# Patient Record
Sex: MALE | Race: WHITE | NOT HISPANIC OR LATINO | URBAN - METROPOLITAN AREA
[De-identification: names, ages, dates, MRNs, and addresses within clinical notes are randomized per-mention and may not be internally consistent; named-entity substitution may affect disease eponyms.]

---

## 2024-06-17 ENCOUNTER — APPOINTMENT (RX ONLY)
Dept: URBAN - METROPOLITAN AREA CLINIC 103 | Facility: CLINIC | Age: 29
Setting detail: DERMATOLOGY
End: 2024-06-17

## 2024-06-17 DIAGNOSIS — L20.89 OTHER ATOPIC DERMATITIS: ICD-10-CM | Status: WORSENING

## 2024-06-17 PROCEDURE — 99204 OFFICE O/P NEW MOD 45 MIN: CPT

## 2024-06-17 PROCEDURE — ? PRESCRIPTION

## 2024-06-17 PROCEDURE — ? OTC TREATMENT REGIMEN

## 2024-06-17 PROCEDURE — ? PRESCRIPTION MEDICATION MANAGEMENT

## 2024-06-17 PROCEDURE — ? GENTLE SKIN CARE INSTRUCTIONS

## 2024-06-17 PROCEDURE — ? COUNSELING

## 2024-06-17 RX ORDER — HYDROCORTISONE 25 MG/G
CREAM TOPICAL
Qty: 30 | Refills: 3 | Status: ERX | COMMUNITY
Start: 2024-06-17

## 2024-06-17 RX ORDER — TRIAMCINOLONE ACETONIDE 1 MG/G
CREAM TOPICAL BID
Qty: 453.6 | Refills: 1 | Status: ERX | COMMUNITY
Start: 2024-06-17

## 2024-06-17 RX ADMIN — TRIAMCINOLONE ACETONIDE: 1 CREAM TOPICAL at 00:00

## 2024-06-17 RX ADMIN — HYDROCORTISONE: 25 CREAM TOPICAL at 00:00

## 2024-06-17 ASSESSMENT — LOCATION SIMPLE DESCRIPTION DERM
LOCATION SIMPLE: RIGHT ANKLE
LOCATION SIMPLE: LEFT ANKLE

## 2024-06-17 ASSESSMENT — ITCH NUMERIC RATING SCALE: HOW SEVERE IS YOUR ITCHING?: 10

## 2024-06-17 ASSESSMENT — SEVERITY ASSESSMENT 2020: SEVERITY 2020: MODERATE

## 2024-06-17 ASSESSMENT — LOCATION ZONE DERM: LOCATION ZONE: LEG

## 2024-06-17 ASSESSMENT — LOCATION DETAILED DESCRIPTION DERM
LOCATION DETAILED: LEFT ANTERIOR MEDIAL MALLEOLUS
LOCATION DETAILED: RIGHT ANTERIOR MEDIAL MALLEOLUS

## 2024-06-17 ASSESSMENT — BSA RASH: BSA RASH: 10

## 2024-06-17 NOTE — PROCEDURE: OTC TREATMENT REGIMEN
Detail Level: Zone
Patient Specific Otc Recommendations (Will Not Stick From Patient To Patient): Dove , Vanicream bar soap

## 2024-06-17 NOTE — PROCEDURE: PRESCRIPTION MEDICATION MANAGEMENT
Initiate Treatment: triamcinolone acetonide 0.1 % topical cream BID\\nQuantity: 453.6 g  Days Supply: 30\\nSig: Apply to the affected areas of the skin twice a day for 2 weeks, then hold for 2 weeks; repeat cycle for flare ups.\\n\\nhydrocortisone 2.5 % topical cream \\nQuantity: 30.0 g  Days Supply: 30\\nSig: Apply bid to the AA x 2 weeks on/off
Render In Strict Bullet Format?: No
Detail Level: Zone
Modify Regimen: Discussed with patient to use hydrocortisone when flares are not bad

## 2024-06-17 NOTE — HPI: RASH (ECZEMA)
How Severe Is Your Eczema?: severe
Is This A New Presentation, Or A Follow-Up?: Rash
Additional History: Zyrtec

## 2024-07-15 ENCOUNTER — APPOINTMENT (RX ONLY)
Dept: URBAN - METROPOLITAN AREA CLINIC 103 | Facility: CLINIC | Age: 29
Setting detail: DERMATOLOGY
End: 2024-07-15

## 2024-07-15 DIAGNOSIS — L20.89 OTHER ATOPIC DERMATITIS: ICD-10-CM

## 2024-07-15 PROCEDURE — ? COUNSELING

## 2024-07-15 PROCEDURE — ? GENTLE SKIN CARE INSTRUCTIONS

## 2024-07-15 PROCEDURE — 99214 OFFICE O/P EST MOD 30 MIN: CPT

## 2024-07-15 PROCEDURE — ? OTC TREATMENT REGIMEN

## 2024-07-15 PROCEDURE — ? PRESCRIPTION MEDICATION MANAGEMENT

## 2024-07-15 ASSESSMENT — LOCATION SIMPLE DESCRIPTION DERM
LOCATION SIMPLE: LEFT ANKLE
LOCATION SIMPLE: RIGHT ANKLE

## 2024-07-15 ASSESSMENT — LOCATION ZONE DERM: LOCATION ZONE: LEG

## 2024-07-15 NOTE — PROCEDURE: COUNSELING
Patient Specific Counseling (Will Not Stick From Patient To Patient): Recommends consult with allergist
Detail Level: Detailed

## 2024-07-15 NOTE — PROCEDURE: PRESCRIPTION MEDICATION MANAGEMENT
Render In Strict Bullet Format?: No
Detail Level: Zone
Modify Regimen: Discussed with patient to use hydrocortisone when flares are not bad
Continue Regimen: triamcinolone acetonide 0.1 % topical cream BID\\nQuantity: 453.6 g  Days Supply: 30\\nSig: Apply to the affected areas of the skin twice a day for 2 weeks, then hold for 2 weeks; repeat cycle for flare ups.\\n\\nhydrocortisone 2.5 % topical cream \\nQuantity: 30.0 g  Days Supply: 30\\nSig: Apply bid to the AA x 2 weeks on/off